# Patient Record
Sex: FEMALE | Race: WHITE | NOT HISPANIC OR LATINO | Employment: OTHER | URBAN - METROPOLITAN AREA
[De-identification: names, ages, dates, MRNs, and addresses within clinical notes are randomized per-mention and may not be internally consistent; named-entity substitution may affect disease eponyms.]

---

## 2021-12-15 ENCOUNTER — TELEPHONE (OUTPATIENT)
Dept: OBGYN CLINIC | Facility: CLINIC | Age: 71
End: 2021-12-15

## 2021-12-15 DIAGNOSIS — Z12.31 ENCOUNTER FOR SCREENING MAMMOGRAM FOR BREAST CANCER: Primary | ICD-10-CM

## 2022-02-08 ENCOUNTER — VBI (OUTPATIENT)
Dept: ADMINISTRATIVE | Facility: OTHER | Age: 72
End: 2022-02-08

## 2022-02-08 NOTE — TELEPHONE ENCOUNTER
Upon review of the In Basket request we were able to locate, review, and update the patient chart as requested for DEXA Scan and Mammogram     Any additional questions or concerns should be emailed to the Practice Liaisons via [email or web address] email, please do not reply via In Basket      Thank you  Randy Smith

## 2022-02-16 PROBLEM — N36.8 PROLAPSE URETHRAL MUCOSA: Status: ACTIVE | Noted: 2022-02-16

## 2022-02-16 PROBLEM — M85.852 OSTEOPENIA OF NECK OF LEFT FEMUR: Status: ACTIVE | Noted: 2022-02-16

## 2022-02-16 NOTE — PROGRESS NOTES
Assessment/Plan:    Encounter for gynecological examination (general) (routine) without abnormal findings  All well, no complaints  Normal breast and pelvic exams  Last pap 2016, no further indicated  Mammo order given  Dexa order given  Colonoscopy done last year, normal     Osteopenia of neck of left femur  Dexa order given       Diagnoses and all orders for this visit:    Encounter for gynecological examination (general) (routine) without abnormal findings    Encounter for screening mammogram for malignant neoplasm of breast  -     Mammo screening bilateral w 3d & cad; Future    Osteopenia of neck of left femur  -     DXA bone density spine hip and pelvis; Future    Other orders  -     indapamide (LOZOL) 1 25 mg tablet; Take 1 25 mg by mouth daily          Subjective:      Patient ID: Nj Jackson is a 67 y o  female  Here for Medicare biannual breast and pelvic exams  The following portions of the patient's history were reviewed and updated as appropriate:   She  has a past medical history of History of pelvic ultrasound (01/2016) and Osteopenia  She   Patient Active Problem List    Diagnosis Date Noted    Encounter for gynecological examination (general) (routine) without abnormal findings 02/17/2022    Osteopenia of neck of left femur 02/16/2022    Prolapse urethral mucosa 02/16/2022     She  has a past surgical history that includes Mammo (historical) (12/21/2021); DXA procedure(historical) (08/22/2019); Colonoscopy (2021); Tubal ligation; and Cryo (Historical) (1990)  Her family history includes Hypertension in her father; Lung cancer in her daughter; Schizophrenia in her daughter; Thyroid disease in her mother  She  reports that she has quit smoking  She has never used smokeless tobacco  She reports current alcohol use of about 7 0 standard drinks of alcohol per week  She reports that she does not use drugs    Current Outpatient Medications   Medication Sig Dispense Refill    indapamide (LOZOL) 1 25 mg tablet Take 1 25 mg by mouth daily       No current facility-administered medications for this visit  She is allergic to tetracycline       Review of Systems  No PMB,  breast, bladder, bowel changes   No new persistent pain, bloating, early satiety or pelvic pressure    Objective:      /72 (BP Location: Left arm, Patient Position: Sitting, Cuff Size: Standard)   Ht 5' 2" (1 575 m)   Wt 72 3 kg (159 lb 6 4 oz)   BMI 29 15 kg/m²          Physical Exam  General appearance: no distress, pleasant  Neck: thyroid without nodules or thyromegaly, no palpable adenopathy  Lymph nodes: no palpable adenopathy  Breasts: no masses, nodes or skin changes  Abdomen: soft, non tender, no palpable masses  Pelvic exam: normal atrophic external genitalia, minimal urethral eversion, vagina atrophic without lesions, cervix atrophic without lesions, uterus small, non tender, no adnexal masses, non tender  Rectal exam: normal sphincter tone, no masses, RV confirms above

## 2022-02-17 ENCOUNTER — OFFICE VISIT (OUTPATIENT)
Dept: OBGYN CLINIC | Facility: CLINIC | Age: 72
End: 2022-02-17
Payer: MEDICARE

## 2022-02-17 VITALS
DIASTOLIC BLOOD PRESSURE: 72 MMHG | WEIGHT: 159.4 LBS | SYSTOLIC BLOOD PRESSURE: 110 MMHG | HEIGHT: 62 IN | BODY MASS INDEX: 29.33 KG/M2

## 2022-02-17 DIAGNOSIS — M85.852 OSTEOPENIA OF NECK OF LEFT FEMUR: ICD-10-CM

## 2022-02-17 DIAGNOSIS — Z12.31 ENCOUNTER FOR SCREENING MAMMOGRAM FOR MALIGNANT NEOPLASM OF BREAST: ICD-10-CM

## 2022-02-17 DIAGNOSIS — Z01.419 ENCOUNTER FOR GYNECOLOGICAL EXAMINATION (GENERAL) (ROUTINE) WITHOUT ABNORMAL FINDINGS: Primary | ICD-10-CM

## 2022-02-17 PROCEDURE — G0101 CA SCREEN;PELVIC/BREAST EXAM: HCPCS | Performed by: OBSTETRICS & GYNECOLOGY

## 2022-02-17 RX ORDER — INDAPAMIDE 1.25 MG/1
1.25 TABLET, FILM COATED ORAL DAILY
COMMUNITY
Start: 2021-12-17

## 2022-02-17 NOTE — ASSESSMENT & PLAN NOTE
All well, no complaints  Normal breast and pelvic exams  Last pap 2016, no further indicated     Mammo order given  Dexa order given  Colonoscopy done last year, normal

## 2022-02-17 NOTE — LETTER
February 17, 2022     Aaron Ybarra MD  [address] [place] [address]    Patient: Brady Guzman   YOB: 1950   Date of Visit: 2/17/2022       Dear Dr Kisha Sr: Thank you for referring Steve Law to me for evaluation  Below are my notes for this consultation  If you have questions, please do not hesitate to call me  I look forward to following your patient along with you  Sincerely,        Velvet Moreau MD        CC: No Recipients  Velvet Moreau MD  2/17/2022  2:27 PM  Sign when Signing Visit  Assessment/Plan:    Encounter for gynecological examination (general) (routine) without abnormal findings  All well, no complaints  Normal breast and pelvic exams  Last pap 2016, no further indicated  Mammo order given  Dexa order given  Colonoscopy done last year, normal     Osteopenia of neck of left femur  Dexa order given       Diagnoses and all orders for this visit:    Encounter for gynecological examination (general) (routine) without abnormal findings    Encounter for screening mammogram for malignant neoplasm of breast  -     Mammo screening bilateral w 3d & cad; Future    Osteopenia of neck of left femur  -     DXA bone density spine hip and pelvis; Future    Other orders  -     indapamide (LOZOL) 1 25 mg tablet; Take 1 25 mg by mouth daily          Subjective:      Patient ID: Brady Guzman is a 67 y o  female  Here for Medicare biannual breast and pelvic exams  The following portions of the patient's history were reviewed and updated as appropriate:   She  has a past medical history of History of pelvic ultrasound (01/2016) and Osteopenia    She   Patient Active Problem List    Diagnosis Date Noted    Encounter for gynecological examination (general) (routine) without abnormal findings 02/17/2022    Osteopenia of neck of left femur 02/16/2022    Prolapse urethral mucosa 02/16/2022     She  has a past surgical history that includes Mammo (historical) (12/21/2021); DXA procedure(historical) (08/22/2019); Colonoscopy (2021); Tubal ligation; and Cryo (Historical) (1990)  Her family history includes Hypertension in her father; Lung cancer in her daughter; Schizophrenia in her daughter; Thyroid disease in her mother  She  reports that she has quit smoking  She has never used smokeless tobacco  She reports current alcohol use of about 7 0 standard drinks of alcohol per week  She reports that she does not use drugs  Current Outpatient Medications   Medication Sig Dispense Refill    indapamide (LOZOL) 1 25 mg tablet Take 1 25 mg by mouth daily       No current facility-administered medications for this visit  She is allergic to tetracycline       Review of Systems  No PMB,  breast, bladder, bowel changes   No new persistent pain, bloating, early satiety or pelvic pressure    Objective:      /72 (BP Location: Left arm, Patient Position: Sitting, Cuff Size: Standard)   Ht 5' 2" (1 575 m)   Wt 72 3 kg (159 lb 6 4 oz)   BMI 29 15 kg/m²          Physical Exam  General appearance: no distress, pleasant  Neck: thyroid without nodules or thyromegaly, no palpable adenopathy  Lymph nodes: no palpable adenopathy  Breasts: no masses, nodes or skin changes  Abdomen: soft, non tender, no palpable masses  Pelvic exam: normal atrophic external genitalia, minimal urethral eversion, vagina atrophic without lesions, cervix atrophic without lesions, uterus small, non tender, no adnexal masses, non tender  Rectal exam: normal sphincter tone, no masses, RV confirms above

## 2022-02-17 NOTE — PATIENT INSTRUCTIONS
Return to office in one year unless having any problems such as breast changes, bleeding, new persistent pain, new progressive bloating, new problems eating (getting full to quickly) or new constant urinary pressure that does not resolve in one week      Sign up for the [employer] access to your portal

## 2023-02-27 NOTE — ASSESSMENT & PLAN NOTE
Dexa reviewed: Stable spine T-2 0; stable hip T-1 5; left femoral neck T-2 3  10 yr hip fx risk 3 7%    Options reviewed, calcium recs reviewed  Would like to monitor and repeat dexa in 2 years

## 2023-03-01 NOTE — PROGRESS NOTES
Assessment/Plan:    Osteopenia of neck of left femur  Dexa reviewed: Stable spine T-2 0; stable hip T-1 5; left femoral neck T-2 3  10 yr hip fx risk 3 7%    Options reviewed, calcium recs reviewed  Would like to monitor and repeat dexa in 2 years  Prolapse urethral mucosa  Here for annual check, no complaints aside from stress of upcoming move  Normal breast and pelvic exams with stable nonfriable urethral prolapse  Mammo order given, last pending [facility] from 2/2023  Dexa reviewed  Colonoscopy 2021, due 2026 (or cologuard per GI)       Diagnoses and all orders for this visit:    Osteopenia of neck of left femur    Encounter for screening mammogram for malignant neoplasm of breast  -     Mammo screening bilateral w 3d & cad; Future    Prolapse urethral mucosa    Other orders  -     Multiple Vitamins-Minerals (MULTIVITAMIN ADULTS 50+ PO); every 24 hours          Subjective:      Patient ID: Ney Mcdonnell is a 75 y o  female  HPI Here for annual gyn check    The following portions of the patient's history were reviewed and updated as appropriate:   She  has a past medical history of History of pelvic ultrasound (01/2016) and Osteopenia  She   Patient Active Problem List    Diagnosis Date Noted   • Encounter for gynecological examination (general) (routine) without abnormal findings 02/17/2022   • Osteopenia of neck of left femur 02/16/2022   • Prolapse urethral mucosa 02/16/2022     She  has a past surgical history that includes Mammo (historical) (12/21/2021); DXA procedure(historical) (08/22/2019); Colonoscopy (2021); Tubal ligation; and Cryo (Historical) (1990)  Her family history includes Hypertension in her father; Lung cancer in her daughter; Schizophrenia in her daughter; Thyroid disease in her mother  She  reports that she has quit smoking  She has never used smokeless tobacco  She reports current alcohol use of about 7 0 standard drinks per week   She reports that she does not use drugs   Current Outpatient Medications   Medication Sig Dispense Refill   • indapamide (LOZOL) 1 25 mg tablet Take 1 25 mg by mouth daily     • Multiple Vitamins-Minerals (MULTIVITAMIN ADULTS 50+ PO) every 24 hours       No current facility-administered medications for this visit  She is allergic to tetracycline       Review of Systems  No PMB, breast, bladder, bowel changes   No new persistent pain, bloating, early satiety or pelvic pressure      Objective:      /76 (BP Location: Left arm, Patient Position: Sitting, Cuff Size: Standard)   Ht 5' 1 5" (1 562 m)   Wt 70 3 kg (155 lb)   Breastfeeding No   BMI 28 81 kg/m²          Physical Exam    General appearance: no distress, pleasant  Neck: thyroid without nodules or thyromegaly, no palpable adenopathy  Lymph nodes: no palpable adenopathy  Breasts: no masses, nodes or skin changes  Abdomen: soft, non tender, no palpable masses  Pelvic exam: normal atrophic external genitalia, minimal urethral eversion, vagina atrophic without lesions, cervix atrophic without lesions, uterus small, non tender, no adnexal masses, non tender  Rectal exam: normal sphincter tone, no masses, RV confirms above

## 2023-03-02 ENCOUNTER — ANNUAL EXAM (OUTPATIENT)
Dept: OBGYN CLINIC | Facility: CLINIC | Age: 73
End: 2023-03-02

## 2023-03-02 VITALS
SYSTOLIC BLOOD PRESSURE: 128 MMHG | WEIGHT: 155 LBS | HEIGHT: 62 IN | BODY MASS INDEX: 28.52 KG/M2 | DIASTOLIC BLOOD PRESSURE: 76 MMHG

## 2023-03-02 DIAGNOSIS — N36.8 PROLAPSE URETHRAL MUCOSA: ICD-10-CM

## 2023-03-02 DIAGNOSIS — Z12.31 ENCOUNTER FOR SCREENING MAMMOGRAM FOR MALIGNANT NEOPLASM OF BREAST: ICD-10-CM

## 2023-03-02 DIAGNOSIS — M85.852 OSTEOPENIA OF NECK OF LEFT FEMUR: Primary | ICD-10-CM

## 2023-03-02 NOTE — PATIENT INSTRUCTIONS
Return to office in one year unless having any problems such as breast changes, bleeding, new persistent pain, new progressive bloating, new problems eating (getting full to quickly) or new constant urinary pressure that does not resolve in one week  Call in six months to schedule your annual visit  Continue to strive for 1200 mg of calcium and 1000 IU of vitamin D daily in diet and supplements combined  You can only absorb 600 mg of calcium at a time  Avoid excess calcium as this may adversely effect your heart

## 2023-03-02 NOTE — LETTER
March 2, 2023     Lizeth Sesay, 1927 [address]    Patient: Karl Roldan   YOB: 1950   Date of Visit: 3/2/2023       Dear Dr Herbert Rees: Thank you for referring Gisselle Collado to me for evaluation  Below are my notes for this consultation  If you have questions, please do not hesitate to call me  I look forward to following your patient along with you  Sincerely,        Rina Noland MD        CC: No Recipients  Rina Noland MD  3/2/2023 10:20 AM  Sign when Signing Visit  Assessment/Plan:    Osteopenia of neck of left femur  Dexa reviewed: Stable spine T-2 0; stable hip T-1 5; left femoral neck T-2 3  10 yr hip fx risk 3 7%    Options reviewed, calcium recs reviewed  Would like to monitor and repeat dexa in 2 years  Prolapse urethral mucosa  Here for annual check, no complaints aside from stress of upcoming move  Normal breast and pelvic exams with stable nonfriable urethral prolapse  Mammo order given, last pending [facility] from 2/2023  Dexa reviewed  Colonoscopy 2021, due 2026 (or cologuard per GI)      Diagnoses and all orders for this visit:    Osteopenia of neck of left femur    Encounter for screening mammogram for malignant neoplasm of breast  -     Mammo screening bilateral w 3d & cad; Future    Prolapse urethral mucosa    Other orders  -     Multiple Vitamins-Minerals (MULTIVITAMIN ADULTS 50+ PO); every 24 hours         Subjective:     Patient ID: Karl Roldan is a 75 y o  female  HPI Here for annual gyn check    The following portions of the patient's history were reviewed and updated as appropriate:   She  has a past medical history of History of pelvic ultrasound (01/2016) and Osteopenia    She   Patient Active Problem List    Diagnosis Date Noted   • Encounter for gynecological examination (general) (routine) without abnormal findings 02/17/2022   • Osteopenia of neck of left femur 02/16/2022   • Prolapse urethral mucosa 02/16/2022     She  has a past surgical history that includes Mammo (historical) (12/21/2021); DXA procedure(historical) (08/22/2019); Colonoscopy (2021); Tubal ligation; and Cryo (Historical) (1990)  Her family history includes Hypertension in her father; Lung cancer in her daughter; Schizophrenia in her daughter; Thyroid disease in her mother  She  reports that she has quit smoking  She has never used smokeless tobacco  She reports current alcohol use of about 7 0 standard drinks per week  She reports that she does not use drugs  Current Outpatient Medications   Medication Sig Dispense Refill   • indapamide (LOZOL) 1 25 mg tablet Take 1 25 mg by mouth daily     • Multiple Vitamins-Minerals (MULTIVITAMIN ADULTS 50+ PO) every 24 hours       No current facility-administered medications for this visit  She is allergic to tetracycline       Review of Systems No PMB, breast, bladder, bowel changes   No new persistent pain, bloating, early satiety or pelvic pressure      Objective:      /76 (BP Location: Left arm, Patient Position: Sitting, Cuff Size: Standard)   Ht 5' 1 5" (1 562 m)   Wt 70 3 kg (155 lb)   Breastfeeding No   BMI 28 81 kg/m²         Physical Exam   General appearance: no distress, pleasant  Neck: thyroid without nodules or thyromegaly, no palpable adenopathy  Lymph nodes: no palpable adenopathy  Breasts: no masses, nodes or skin changes  Abdomen: soft, non tender, no palpable masses  Pelvic exam: normal atrophic external genitalia, minimal urethral eversion, vagina atrophic without lesions, cervix atrophic without lesions, uterus small, non tender, no adnexal masses, non tender  Rectal exam: normal sphincter tone, no masses, RV confirms above

## 2023-03-02 NOTE — ASSESSMENT & PLAN NOTE
Here for annual check, no complaints aside from stress of upcoming move  Normal breast and pelvic exams with stable nonfriable urethral prolapse     Mammo order given, last pending [facility] from 2/2023  Dexa reviewed  Colonoscopy 2021, due 2026 (or cologuard per GI)

## 2023-03-15 DIAGNOSIS — Z12.31 ENCOUNTER FOR SCREENING MAMMOGRAM FOR MALIGNANT NEOPLASM OF BREAST: ICD-10-CM

## 2023-03-17 ENCOUNTER — TELEPHONE (OUTPATIENT)
Dept: OBGYN CLINIC | Facility: CLINIC | Age: 73
End: 2023-03-17

## 2023-03-17 NOTE — TELEPHONE ENCOUNTER
Pt called for mammogram results done nick [facility] on 3/16/23  Pt informed of a normal mammogram as noted on 3/16/23

## 2024-02-21 PROBLEM — Z01.419 ENCOUNTER FOR GYNECOLOGICAL EXAMINATION (GENERAL) (ROUTINE) WITHOUT ABNORMAL FINDINGS: Status: RESOLVED | Noted: 2022-02-17 | Resolved: 2024-02-21

## 2024-04-15 ENCOUNTER — TELEPHONE (OUTPATIENT)
Dept: OBGYN CLINIC | Facility: CLINIC | Age: 74
End: 2024-04-15

## 2024-04-15 DIAGNOSIS — Z12.31 ENCOUNTER FOR SCREENING MAMMOGRAM FOR MALIGNANT NEOPLASM OF BREAST: Primary | ICD-10-CM

## 2024-04-15 NOTE — TELEPHONE ENCOUNTER
Thu from [facility] called requesting valid mammogram for pt on-site. Fax# [phone]. Elida assisted to generate mammogram & will fax to [facility].

## 2024-04-26 NOTE — PROGRESS NOTES
Assessment/Plan:    Encounter for gynecological examination (general) (routine) without abnormal findings  Here for well check, all well aside from GI effects of high spinach diet last week.   No breast or gyn complaints.  Normal breast and pelvic exams.  Last pap 2016, no further indicated  Mammo order given, last 2/23/23  Colonoscopy 2021, due 2026?  may due cologuard  Dexa 2/2023 Stable spine T-2.0; stable hip T-1.5; left femoral neck T-2.3. 10 yr hip fx risk 3.7%. Due in 2025  RTO 2 years, prn problems.       Diagnoses and all orders for this visit:    Encounter for gynecological examination (general) (routine) without abnormal findings    Other screening mammogram  -     Mammo screening bilateral w 3d & cad; Future          Subjective:      Patient ID: Diana Harmon is a 74 y.o. female.    HPI Here for Medicare biannual breast and pelvic exams.     The following portions of the patient's history were reviewed and updated as appropriate: She  has a past medical history of History of pelvic ultrasound (01/2016) and Osteopenia.  She   Patient Active Problem List    Diagnosis Date Noted    Osteopenia of neck of left femur 02/16/2022    Prolapse urethral mucosa 02/16/2022     She  has a past surgical history that includes Mammo (historical) (12/21/2021); DXA procedure(historical) (08/22/2019); Colonoscopy (2021); Tubal ligation; and Cryo (Historical) (1990).  Her family history includes Hypertension in her father; Lung cancer in her daughter; Schizophrenia in her daughter; Thyroid disease in her mother.  She  reports that she has quit smoking. She has never used smokeless tobacco. She reports current alcohol use of about 7.0 standard drinks of alcohol per week. She reports that she does not use drugs.  Current Outpatient Medications   Medication Sig Dispense Refill    indapamide (LOZOL) 1.25 mg tablet Take 1.25 mg by mouth daily      Multiple Vitamins-Minerals (MULTIVITAMIN ADULTS 50+ PO) every 24 hours    "    No current facility-administered medications for this visit.     She is allergic to tetracycline..    Review of Systems  No PMB, breast, bladder, bowel changes. No new persistent pain, bloating, early satiety or pelvic pressure  Resolving stomach discomfort and bloating after spinach diet    Objective:      /70 (BP Location: Left arm, Patient Position: Sitting, Cuff Size: Standard)   Ht 5' 2\" (1.575 m)   Wt 72.8 kg (160 lb 9.6 oz)   BMI 29.37 kg/m²          Physical Exam    General appearance: no distress, pleasant  Neck: thyroid without nodules or thyromegaly, no palpable adenopathy  Lymph nodes: no palpable adenopathy  Breasts: no masses, nodes or skin changes  Abdomen: soft, non tender, no palpable masses  Pelvic exam: normal atrophic external genitalia, urethral meatus normal, vagina atrophic without lesions, cervix atrophic without lesions, uterus small, non tender, no adnexal masses, non tender  Rectal exam: normal sphincter tone, no masses, RV confirms above    "

## 2024-04-29 ENCOUNTER — OFFICE VISIT (OUTPATIENT)
Dept: OBGYN CLINIC | Facility: CLINIC | Age: 74
End: 2024-04-29
Payer: MEDICARE

## 2024-04-29 VITALS
BODY MASS INDEX: 29.55 KG/M2 | SYSTOLIC BLOOD PRESSURE: 112 MMHG | HEIGHT: 62 IN | WEIGHT: 160.6 LBS | DIASTOLIC BLOOD PRESSURE: 70 MMHG

## 2024-04-29 DIAGNOSIS — Z12.31 OTHER SCREENING MAMMOGRAM: ICD-10-CM

## 2024-04-29 DIAGNOSIS — Z01.419 ENCOUNTER FOR GYNECOLOGICAL EXAMINATION (GENERAL) (ROUTINE) WITHOUT ABNORMAL FINDINGS: Primary | ICD-10-CM

## 2024-04-29 PROCEDURE — G0101 CA SCREEN;PELVIC/BREAST EXAM: HCPCS | Performed by: OBSTETRICS & GYNECOLOGY

## 2024-04-29 NOTE — ASSESSMENT & PLAN NOTE
Here for well check, all well aside from GI effects of high spinach diet last week.   No breast or gyn complaints.  Normal breast and pelvic exams.  Last pap 2016, no further indicated  Mammo order given, last 2/23/23  Colonoscopy 2021, due 2026?  may due cologuard  Dexa 2/2023 Stable spine T-2.0; stable hip T-1.5; left femoral neck T-2.3. 10 yr hip fx risk 3.7%. Due in 2025  RTO 2 years, prn problems.

## 2024-04-29 NOTE — PATIENT INSTRUCTIONS
Return to office in two years unless having any problems such as breast changes, bleeding, new persistent pain, new progressive bloating, new problems eating (getting full to quickly) or new constant urinary pressure that does not resolve in one week.      Call next year for your mammo and dexa orders if you do not get them from your PCP.   Call next year for your 2025 visit here.

## 2024-04-29 NOTE — LETTER
April 29, 2024     Tamanna Simpson MD  [address] [facility] [address]    Patient: Diana Harmon   YOB: 1950   Date of Visit: 4/29/2024       Dear Dr. Simpson:    Thank you for referring Diana Harmon to me for evaluation. Below are my notes for this consultation.    If you have questions, please do not hesitate to call me. I look forward to following your patient along with you.         Sincerely,        Vera Low MD        CC: No Recipients    Vera Low MD  4/29/2024  9:31 AM  Sign when Signing Visit  Assessment/Plan:    Encounter for gynecological examination (general) (routine) without abnormal findings  Here for well check, all well aside from GI effects of high spinach diet last week.   No breast or gyn complaints.  Normal breast and pelvic exams.  Last pap 2016, no further indicated  Mammo order given, last 2/23/23  Colonoscopy 2021, due 2026?  may due cologuard  Dexa 2/2023 Stable spine T-2.0; stable hip T-1.5; left femoral neck T-2.3. 10 yr hip fx risk 3.7%. Due in 2025  RTO 2 years, prn problems.       Diagnoses and all orders for this visit:    Encounter for gynecological examination (general) (routine) without abnormal findings    Other screening mammogram  -     Mammo screening bilateral w 3d & cad; Future          Subjective:      Patient ID: Diana Harmon is a 74 y.o. female.    HPI Here for Medicare biannual breast and pelvic exams.     The following portions of the patient's history were reviewed and updated as appropriate: She  has a past medical history of History of pelvic ultrasound (01/2016) and Osteopenia.  She   Patient Active Problem List    Diagnosis Date Noted   • Osteopenia of neck of left femur 02/16/2022   • Prolapse urethral mucosa 02/16/2022     She  has a past surgical history that includes Mammo (historical) (12/21/2021); DXA procedure(historical) (08/22/2019); Colonoscopy (2021); Tubal ligation; and Cryo (Historical) (1990).  Her family  "history includes Hypertension in her father; Lung cancer in her daughter; Schizophrenia in her daughter; Thyroid disease in her mother.  She  reports that she has quit smoking. She has never used smokeless tobacco. She reports current alcohol use of about 7.0 standard drinks of alcohol per week. She reports that she does not use drugs.  Current Outpatient Medications   Medication Sig Dispense Refill   • indapamide (LOZOL) 1.25 mg tablet Take 1.25 mg by mouth daily     • Multiple Vitamins-Minerals (MULTIVITAMIN ADULTS 50+ PO) every 24 hours       No current facility-administered medications for this visit.     She is allergic to tetracycline..    Review of Systems  No PMB, breast, bladder, bowel changes. No new persistent pain, bloating, early satiety or pelvic pressure  Resolving stomach discomfort and bloating after spinach diet    Objective:      /70 (BP Location: Left arm, Patient Position: Sitting, Cuff Size: Standard)   Ht 5' 2\" (1.575 m)   Wt 72.8 kg (160 lb 9.6 oz)   BMI 29.37 kg/m²          Physical Exam    General appearance: no distress, pleasant  Neck: thyroid without nodules or thyromegaly, no palpable adenopathy  Lymph nodes: no palpable adenopathy  Breasts: no masses, nodes or skin changes  Abdomen: soft, non tender, no palpable masses  Pelvic exam: normal atrophic external genitalia, urethral meatus normal, vagina atrophic without lesions, cervix atrophic without lesions, uterus small, non tender, no adnexal masses, non tender  Rectal exam: normal sphincter tone, no masses, RV confirms above    "

## 2025-05-12 ENCOUNTER — HOSPITAL ENCOUNTER (OUTPATIENT)
Dept: HOSPITAL 99 - RAD | Age: 75
End: 2025-05-12
Payer: MEDICARE

## 2025-05-12 DIAGNOSIS — R22.1: Primary | ICD-10-CM

## 2025-05-29 DIAGNOSIS — Z12.31 ENCOUNTER FOR SCREENING MAMMOGRAM FOR MALIGNANT NEOPLASM OF BREAST: Primary | ICD-10-CM

## 2025-06-06 ENCOUNTER — RESULTS FOLLOW-UP (OUTPATIENT)
Dept: OBGYN CLINIC | Facility: CLINIC | Age: 75
End: 2025-06-06